# Patient Record
Sex: FEMALE | Race: ASIAN | NOT HISPANIC OR LATINO | Employment: UNEMPLOYED | URBAN - METROPOLITAN AREA
[De-identification: names, ages, dates, MRNs, and addresses within clinical notes are randomized per-mention and may not be internally consistent; named-entity substitution may affect disease eponyms.]

---
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
## 2023-05-02 DIAGNOSIS — H65.02 ACUTE SEROUS OTITIS MEDIA OF LEFT EAR, RECURRENCE NOT SPECIFIED: ICD-10-CM

## 2023-05-02 DIAGNOSIS — J30.2 SEASONAL ALLERGIC RHINITIS, UNSPECIFIED TRIGGER: ICD-10-CM

## 2023-05-02 RX ORDER — CETIRIZINE HYDROCHLORIDE 10 MG/1
10 TABLET ORAL EVERY EVENING
Qty: 90 TABLET | Refills: 4 | Status: SHIPPED | OUTPATIENT
Start: 2023-05-02

## 2023-05-02 RX ORDER — FLUTICASONE PROPIONATE 50 MCG
2 SPRAY, SUSPENSION (ML) NASAL DAILY
Qty: 9.9 ML | Refills: 11 | Status: SHIPPED | OUTPATIENT
Start: 2023-05-02

## 2023-05-02 NOTE — TELEPHONE ENCOUNTER
[facility] phone call message- medication clarification/question:    Full Medication Name: fluticasone (FLONASE) 50 MCG/ACT nasal spray      cetirizine (ZYRTEC) 10 MG tablet      Question: Patient called in asking for refill medication above. Patient stated she has seasonal allergies and would like for these medication to be refilled as it helps her through the allergies season. Please call and advise, if needed. Thank you     Pharmacy confirmed as      [id number] - [place], [place] - [address]: Yes    OK to leave a message on voice mail? Yes    Primary language: English      needed? No    Call taken on May 2, 2023 at 11:24 AM by Mahin Gaston

## 2023-10-17 NOTE — PROGRESS NOTES
ASSESSMENT/PLAN:    (M54.50,  G89.29) Chronic bilateral low back pain without sciatica  (primary encounter diagnosis)  Comment: exam consistent w/ central disc bulge; will have her do PT (recommended at least 1 session to learn a home program as cost is prohibitive); naproxen prn pain; f/up in 2 months; if no better, consider imaging  Plan: Physical Therapy Referral, naproxen (NAPROSYN) 500 MG tablet    Physician Attestation   I, Cody Franco MD, was present with the medical student, Frantz BEGUM, who participated in the service and in the documentation of the note.  I have verified the history and personally performed the physical exam and medical decision making.  I agree with the assessment and plan of care as documented in the note.      MD Cody Dubois MD  October 19, 2023  2:01 PM      Pt is a 18 year old female last seen on 12/15/2022 by Dr Duran here for :     HPI:   Back pain:   Location: Middle of the lower back  Duration: Since september labor day, she sits a lot, mainly the pain is during sitting. notes tension   Radiation: No   Numbness/ Tingling? Initially in september but not currently   Weakness? Problems crouching because of pain   Flexion or Extension bias: Flexion hurts more than extension   Red flags? No bowel changes    Meds tried? No. Ice packs, massages, machine gun  PT? No   Imaging? No       Past Medical History:   Diagnosis Date    NO ACTIVE PROBLEMS       Current Outpatient Medications   Medication Sig Dispense Refill    cetirizine (ZYRTEC) 10 MG tablet Take 1 tablet (10 mg) by mouth every evening 90 tablet 4    fluticasone (FLONASE) 50 MCG/ACT nasal spray Spray 2 sprays into both nostrils daily 9.9 mL 11    norgestimate-ethinyl estradiol (ORTHO-CYCLEN) 0.25-35 MG-MCG tablet Take 1 tablet by mouth daily 84 tablet 4      Allergies   Allergen Reactions    Nka [No Known Allergies]       ROS:   Gen- no fevers/chills   Rheum - no morning stiffness   Derm - no rash/  redness   Neuro - see HPI  Remainder of ROS negative.     Exam:       BACK:   ROM: flexion -full /extension -full /lateral rotation- full /side bend- full; pain worse w/ flexion>extension on exam  Bony tenderness: midline lower  Paraspinal tenderness: None.   Sensation: intact in b/l lower extremities.   Strength: 5/5 w/ dorsiflexion/ plantarflexion/ inversion/ eversion/ knee flexion/ extension.   Maneuvers: Neg Slump b/l -> reproduces central lower back pain; Neg MIREILLE   Neuro: DTR's 2+. Babinski's downgoing. Neg Clonus

## 2023-10-18 ENCOUNTER — OFFICE VISIT (OUTPATIENT)
Dept: FAMILY MEDICINE | Facility: CLINIC | Age: 18
End: 2023-10-18
Payer: COMMERCIAL

## 2023-10-18 VITALS
WEIGHT: 222 LBS | OXYGEN SATURATION: 100 % | RESPIRATION RATE: 18 BRPM | DIASTOLIC BLOOD PRESSURE: 89 MMHG | HEART RATE: 98 BPM | SYSTOLIC BLOOD PRESSURE: 128 MMHG | BODY MASS INDEX: 41.95 KG/M2

## 2023-10-18 DIAGNOSIS — G89.29 CHRONIC BILATERAL LOW BACK PAIN WITHOUT SCIATICA: Primary | ICD-10-CM

## 2023-10-18 DIAGNOSIS — M54.50 CHRONIC BILATERAL LOW BACK PAIN WITHOUT SCIATICA: Primary | ICD-10-CM

## 2023-10-18 PROCEDURE — 99213 OFFICE O/P EST LOW 20 MIN: CPT | Performed by: FAMILY MEDICINE

## 2023-10-18 RX ORDER — NAPROXEN 500 MG/1
500 TABLET ORAL 2 TIMES DAILY WITH MEALS
Qty: 30 TABLET | Refills: 1 | Status: SHIPPED | OUTPATIENT
Start: 2023-10-18

## 2023-10-18 NOTE — COMMUNITY RESOURCES LIST (ENGLISH)
10/18/2023   [facility] [employer]  N/A  For questions about this resource list or additional care needs, please contact your primary care clinic or care manager.  Phone: [phone]   Email: N/A   Address: [address] [place] [id number]   Hours: N/A        Financial Stability       Utility payment assistance  1  [employer] ([facility]) [facility] - Energy Assistance Distance: 2 miles      Phone/Virtual   [address] [place] [address]  Language: English, Hmong, Indian, Chilean  Hours: Mon - Fri 8:00 AM - 12:00 PM , Mon - Fri 1:00 PM - 4:30 PM  Fees: Free   Phone: (287) 295-9898 Email: eaursula@caprw.org Website: http://www.[employer].org     2  [facility] Distance: 2.19 miles      Phone/Virtual   [address] [place] [address]  Language: English, Chilean  Hours: Mon - Thu 8:00 AM - 4:00 PM , Fri 8:00 AM - 12:00 PM  Fees: Free   Phone: (665) 864-8341 Email: ecc@[employer].org Website: http://[employer].org/          Important Numbers & Websites       Emergency Services   911  City Services   311  Poison Control   (420) 358-9628  Suicide Prevention Lifeline   (514) 600-9140 (TALK)  Child Abuse Hotline   (589) 661-9694 (4-A-Child)  Sexual Assault Hotline   (800) 206-4626 (HOPE)  National Runaway Safeline   (305) 659-2210 (RUNAWAY)  All-Options Talkline   (904) 141-2964  Substance Abuse Referral   (147) 526-3193 (HELP)

## 2023-11-15 ENCOUNTER — THERAPY VISIT (OUTPATIENT)
Dept: PHYSICAL THERAPY | Facility: REHABILITATION | Age: 18
End: 2023-11-15
Attending: FAMILY MEDICINE
Payer: COMMERCIAL

## 2023-11-15 DIAGNOSIS — M54.50 CHRONIC BILATERAL LOW BACK PAIN WITHOUT SCIATICA: ICD-10-CM

## 2023-11-15 DIAGNOSIS — G89.29 CHRONIC BILATERAL LOW BACK PAIN WITHOUT SCIATICA: ICD-10-CM

## 2023-11-15 DIAGNOSIS — R29.898 DECREASED STRENGTH OF TRUNK AND BACK: Primary | ICD-10-CM

## 2023-11-15 PROCEDURE — 97161 PT EVAL LOW COMPLEX 20 MIN: CPT | Mod: GP | Performed by: PHYSICAL THERAPIST

## 2023-11-15 PROCEDURE — 97110 THERAPEUTIC EXERCISES: CPT | Mod: GP | Performed by: PHYSICAL THERAPIST

## 2023-11-15 NOTE — PROGRESS NOTES
PHYSICAL THERAPY EVALUATION  Type of Visit: Evaluation    See electronic medical record for Abuse and Falls Screening details.    Subjective   Pt is a 18 year-old woman with chief complaint chronic LBP. At first the pain did go down into both legs for 1-2 days, but since then has had no pain/numbness in the legs. Massage helps for a few days, but the pain will typically return. 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 Doing OK standing, but bending forward/sitting is worse. Her pain started without incident/injury nearly 3 months ago and it is slowly getting better over time, but pain persists.       Presenting condition or subjective complaint: Back pain  Date of onset: 10/18/23 (date of order)    Relevant medical history:     Dates & types of surgery:      Prior diagnostic imaging/testing results:       Prior therapy history for the same diagnosis, illness or injury: No      Prior Level of Function  Transfers: Independent  Ambulation: Independent  ADL: Independent      Living Environment  Social support: With family members   Type of home: House   Stairs to enter the home: No       Ramp: No   Stairs inside the home: Yes       Help at home: None  Equipment owned:       Employment: No    Hobbies/Interests: Doing nails    Patient goals for therapy: Bend down, sit for too long, turn my body    Pain assessment: Pain present  Location: central low back, right>left/Rating: sitting 3/10, at worst in the past few days 5/10     Objective   LUMBAR SPINE EVALUATION    INTEGUMENTARY (edema, incisions): WNL  POSTURE: WNL  GAIT:   Weightbearing Status: WBAT  Assistive Device(s): None  Gait Deviations: WNL  BALANCE/PROPRIOCEPTION: WNL    ROM:   (Degrees) Left AROM Left PROM  Right AROM Right PROM   Hip Flexion WNL  WNL    Hip Extension WFL  WFL    Hip Abduction       Hip Adduction       Hip Internal Rotation  WNL  WNL   Hip External Rotation  WNL  WNL   Knee Flexion  WNL  WNL    Knee Extension WNL  WNL    Lumbar Side glide     Lumbar Flexion WNL, slow,  felt painful/tight    Lumbar Extension WNL with increased tension     Right side bending: WNL with central LBP  Left side bending: WNL    PELVIC/SI SCREEN: sacral thrust negative  STRENGTH: B hamstrings: 4+/5 with LBP  B hip adduction: 5/5  B hip abduction (in sitting): 4-/5 with LBP  Left hip ext: 5/5  Right hip ext: 4/5 with mild pain  TA: 3+/5    MYOTOMES:    Left Right   T12-L3 (Hip Flexion) 5 5   L2-4 (Quads)  5 5   L4 (Ankle DF) 5 5   L5 (Great Toe Ext) 5 5   S1 (Toe Raise) 5 5     DERMATOMES: WNL    LUMBAR/HIP Special Tests:    Left Right   MIREILLE Negative  Negative    FADIR/Labrum/RAFAEL     Femoral Nerve     Manny's     Piriformis     Quadrant Testing     SLR Negative  Positive (negative passive SLR)   Slump Negative  Positive   Stork with Extension     Bob Xavier's: negative on left, positive on right for pain in low back  Prone instability test: negative  PELVIS/SI SPECIAL TESTS:   FUNCTIONAL TESTS:   PALPATION: tender right lumbar paraspinals (pt's main area of pain)  SPINAL SEGMENTAL CONCLUSIONS: WNL, painful at L4, L5      Assessment & Plan   CLINICAL IMPRESSIONS  Medical Diagnosis: Chronic bilateral low back pain without sciatica    Treatment Diagnosis: Decreased strength of trunk and back   Impression/Assessment: Pt is a 18 year-old woman with chief complaint chronic LBP. She demonstrates possible disc irritation (with increased pain with lumbar flx noted and positive slump and SLR on the R side) and reports difficulty with activities in school, particularly bending forward and sitting. She demonstrates decreased core strength and R hip strength with negative sacral findings and negative instability; pain is centered at L4 with ventral glides and along lumbar paraspinals on the right side. Because of the cost of coming to therapy, planning this visit only and provided pt with HEP to work on at home.      Clinical Decision Making (Complexity):  Clinical Presentation: Stable/Uncomplicated  Clinical Presentation Rationale: based on medical and personal factors listed in PT evaluation  Clinical Decision Making (Complexity): Low complexity    PLAN OF CARE  Treatment Interventions:  Interventions: Therapeutic Exercise    Long Term Goals            Frequency of Treatment: 1 visit  Duration of Treatment: 1 visit           Risks and benefits of evaluation/treatment have been explained.   Patient/Family/caregiver agrees with Plan of Care.     Evaluation Time:     PT Eval, Low Complexity Minutes (02620): 24       Signing Clinician: GWEN Ibanez [facility]                                                                                   OUTPATIENT PHYSICAL THERAPY      PLAN OF TREATMENT FOR OUTPATIENT REHABILITATION   Patient's Last Name, First Name, Aster Rosales YOB: 2005   Provider's Name   [facility]   Medical Record No.  [id number]     Onset Date: 10/18/23 (date of order)  Start of Care Date: 11/15/23     Medical Diagnosis:  Chronic bilateral low back pain without sciatica      PT Treatment Diagnosis:  Decreased strength of trunk and back Plan of Treatment  Frequency/Duration: 1 visit/ 1 visit    Certification date from 11/15/23 to 11/15/23         See note for plan of treatment details and functional goals     Candis Avina, PT                         I CERTIFY THE NEED FOR THESE SERVICES FURNISHED UNDER        THIS PLAN OF TREATMENT AND WHILE UNDER MY CARE     (Physician attestation of this document indicates review and certification of the therapy plan).              Referring Provider:  Cody Franco    Initial Assessment  See Epic Evaluation- Start of Care Date: 11/15/23